# Patient Record
Sex: FEMALE | Race: WHITE | Employment: FULL TIME
[De-identification: names, ages, dates, MRNs, and addresses within clinical notes are randomized per-mention and may not be internally consistent; named-entity substitution may affect disease eponyms.]

---

## 2023-09-05 ENCOUNTER — HOSPITAL ENCOUNTER (OUTPATIENT)
Facility: HOSPITAL | Age: 53
Discharge: HOME | End: 2023-09-08
Payer: COMMERCIAL

## 2023-09-05 DIAGNOSIS — H53.19 OTHER SUBJECTIVE VISUAL DISTURBANCES: ICD-10-CM

## 2023-09-05 DIAGNOSIS — H53.2 DIPLOPIA: ICD-10-CM

## 2023-09-05 DIAGNOSIS — E05.00 THYROTOXICOSIS WITH DIFFUSE GOITER WITHOUT THYROTOXIC CRISIS OR STORM: ICD-10-CM

## 2023-09-05 PROCEDURE — 70481 CT ORBIT/EAR/FOSSA W/DYE: CPT

## 2023-09-05 PROCEDURE — 6360000004 HC RX CONTRAST MEDICATION: Performed by: OPHTHALMOLOGY

## 2023-09-05 RX ADMIN — IOPAMIDOL 100 ML: 755 INJECTION, SOLUTION INTRAVENOUS at 13:49

## 2024-06-12 ENCOUNTER — OFFICE VISIT (OUTPATIENT)
Age: 54
End: 2024-06-12
Payer: COMMERCIAL

## 2024-06-12 VITALS
HEART RATE: 72 BPM | TEMPERATURE: 98.2 F | HEIGHT: 67 IN | OXYGEN SATURATION: 99 % | SYSTOLIC BLOOD PRESSURE: 135 MMHG | WEIGHT: 162.4 LBS | DIASTOLIC BLOOD PRESSURE: 80 MMHG | BODY MASS INDEX: 25.49 KG/M2

## 2024-06-12 DIAGNOSIS — E05.00 GRAVES DISEASE: Primary | ICD-10-CM

## 2024-06-12 DIAGNOSIS — H57.89 THYROID EYE DISEASE: ICD-10-CM

## 2024-06-12 DIAGNOSIS — E07.9 THYROID EYE DISEASE: ICD-10-CM

## 2024-06-12 PROCEDURE — 99204 OFFICE O/P NEW MOD 45 MIN: CPT | Performed by: INTERNAL MEDICINE

## 2024-06-12 PROCEDURE — 3017F COLORECTAL CA SCREEN DOC REV: CPT | Performed by: INTERNAL MEDICINE

## 2024-06-12 PROCEDURE — G8419 CALC BMI OUT NRM PARAM NOF/U: HCPCS | Performed by: INTERNAL MEDICINE

## 2024-06-12 PROCEDURE — G8427 DOCREV CUR MEDS BY ELIG CLIN: HCPCS | Performed by: INTERNAL MEDICINE

## 2024-06-12 PROCEDURE — 4004F PT TOBACCO SCREEN RCVD TLK: CPT | Performed by: INTERNAL MEDICINE

## 2024-06-12 RX ORDER — METHIMAZOLE 10 MG/1
10 TABLET ORAL DAILY
COMMUNITY

## 2024-06-12 NOTE — PROGRESS NOTES
Endocrine Consultation    PCP: No primary care provider on file.    Chief Complaint   Patient presents with    Thyroid Problem     HPI:  Crystal Zaragoza is a 53 y.o. female with  has a past medical history of Anxiety, Graves disease, and Hypertension. who is seen in consultation at the request of No primary care provider on file. for evaluation of graves disease.     - dx w/hyperthyroidism in 2023   - on methimazole since then at varying doses  - switched insurances so switching endocrine care here  - now methimazole 10 mg daily, last dose change 3/2024  - also w/BARBRA, most notably diplopia, elevated TSI, 9/2023 CT w/enlargement and enhancement of the IR and MR bilaterally c/w BARBRA, start PO steroid taper at that time. Clinical activity score 3 at that time  - s/p Tepezza infusions, last 3/2024, infusions complete  - ophthalmology has recommended glasses w/prisms w/optometry  - prior thyroid US w/hypervascularity without nodules     Full ROS completed this visit:  Negative for weight gain, weight loss, fevers, chills, blurry vision, changes in vision, chest pain, palpitations, leg swelling, shortness of breath, wheezing, snoring, abdominal pain, nausea, vomiting, diarrhea, constipation, frequent urination, dysuria, tremors, fainting, shakes, cold intolerance, hot intolerance,foot sores, rash.    LABS/STUDIES:     No results found for: \"EFF2ENFO\"    No results found for: \"LABA1C\", \"[id number]\", \"CREATININE\", \"LABGLOM\", \"EGFR\", \"MALBCR\"    No results found for: \"CHOL\", \"TRIG\", \"HDL\"    No results found for: \"TSH\"    No results found for: \"CPEPLT\", \"CPEPTIDE\"    Current Outpatient Medications   Medication Sig Dispense Refill    methIMAzole (TAPAZOLE) 10 MG tablet Take 1 tablet by mouth daily       No current facility-administered medications for this visit.        Past Medical History:   Diagnosis Date    Anxiety     Graves disease     Hypertension         No past surgical history on file.     Social History     Tobacco

## 2024-06-12 NOTE — ASSESSMENT & PLAN NOTE
Dx w 8/2023 w/severe graves disease c/b BARBRA s/p Tepezza infusions x 8   - repeat labs today to guide dosing  - consider definitive therapy w/surgery if no remission   - continue methimazole 10 mg daily until labs back  - non smoker     Regarding thionamide: Patient cautioned regarding the risks of agranulocytosis/neutropenia or liver toxicity.  Was told that if development of fever, sore throat or sores in mouth, immediately contact me or PCP to check white blood cell count.  If development of nausea, vomiting or scleral icterus, immediately contact me or PCP to check liver function.  Patient informed that if the side effects develop and the drug is discontinued the problem generally recovers within a few days without additional therapy but if the drug is continued can develop serious, even life-threatening infection or liver failure.

## 2024-07-11 ENCOUNTER — OFFICE VISIT (OUTPATIENT)
Age: 54
End: 2024-07-11
Payer: COMMERCIAL

## 2024-07-11 VITALS
DIASTOLIC BLOOD PRESSURE: 81 MMHG | OXYGEN SATURATION: 96 % | WEIGHT: 163 LBS | BODY MASS INDEX: 25.58 KG/M2 | HEART RATE: 85 BPM | HEIGHT: 67 IN | SYSTOLIC BLOOD PRESSURE: 133 MMHG | TEMPERATURE: 97.8 F

## 2024-07-11 DIAGNOSIS — E05.00 GRAVES DISEASE: Primary | ICD-10-CM

## 2024-07-11 DIAGNOSIS — H57.89 THYROID EYE DISEASE: ICD-10-CM

## 2024-07-11 DIAGNOSIS — E07.9 THYROID EYE DISEASE: ICD-10-CM

## 2024-07-11 PROCEDURE — 99214 OFFICE O/P EST MOD 30 MIN: CPT | Performed by: INTERNAL MEDICINE

## 2024-07-11 PROCEDURE — G8419 CALC BMI OUT NRM PARAM NOF/U: HCPCS | Performed by: INTERNAL MEDICINE

## 2024-07-11 PROCEDURE — G8427 DOCREV CUR MEDS BY ELIG CLIN: HCPCS | Performed by: INTERNAL MEDICINE

## 2024-07-11 PROCEDURE — 3017F COLORECTAL CA SCREEN DOC REV: CPT | Performed by: INTERNAL MEDICINE

## 2024-07-11 PROCEDURE — 4004F PT TOBACCO SCREEN RCVD TLK: CPT | Performed by: INTERNAL MEDICINE

## 2024-07-11 RX ORDER — SPIRONOLACTONE 50 MG/1
50 TABLET, FILM COATED ORAL DAILY
COMMUNITY
Start: 2024-04-14

## 2024-07-11 RX ORDER — SERTRALINE HCL 50 MG
50 TABLET ORAL DAILY
COMMUNITY
Start: 2008-04-02

## 2024-07-11 RX ORDER — METHIMAZOLE 5 MG/1
5 TABLET ORAL DAILY
Qty: 90 TABLET | Refills: 0 | Status: SHIPPED | OUTPATIENT
Start: 2024-07-11

## 2024-07-11 NOTE — PROGRESS NOTES
Endocrine Follow up     PCP: Jes Macdonald MD    Chief Complaint   Patient presents with    Graves' Disease     HPI:  Crystal Zaragoza is a 53 y.o. female with  has a past medical history of Anxiety, Graves disease, Hypertension, and Hyperthyroidism. Here for follow up of graves disease.     Initial visit notes 6/12/24  - dx w/hyperthyroidism in 2023   - on methimazole since then at varying doses  - switched insurances so switching endocrine care here  - now methimazole 10 mg daily, last dose change 3/2024  - also w/BARBRA, most notably diplopia, elevated TSI, 9/2023 CT w/enlargement and enhancement of the IR and MR bilaterally c/w BARBRA, start PO steroid taper at that time. Clinical activity score 3 at that time  - s/p Tepezza infusions, last 3/2024, infusions complete  - ophthalmology has recommended glasses w/prisms w/optometry  - prior thyroid US w/hypervascularity without nodules     Today -   On methimazole 10 mg daily   Labs 6/13/24- TSH 3.91, TSI Ab negative, FT4 1.1, FT3 3.2 =, cbc NL  Feeling well  BRIEF ROS   Gen: no fevers/chills  CV: no chest pain  Resp: no shortness of breath, cough   GI: no nausea, emesis, abdominal pain  Neuro: no confusion     LABS/STUDIES:   No results found for: \"WJM0RGBY\"  No results found for: \"LABA1C\", \"WQHM8RCVT\", \"CREATININE\", \"LABGLOM\", \"EGFR\", \"MALBCR\"  No results found for: \"CHOL\", \"TRIG\", \"HDL\"  No results found for: \"TSH\"  No results found for: \"CPEPLT\", \"CPEPTIDE\"    Current Outpatient Medications   Medication Sig Dispense Refill    spironolactone (ALDACTONE) 50 MG tablet Take 1 tablet by mouth daily      ZOLOFT 50 MG tablet Take 1 tablet by mouth daily      Multiple Vitamins-Minerals (MULTIVITAMIN ADULTS PO) Take by mouth daily      methIMAzole (TAPAZOLE) 5 MG tablet Take 1 tablet by mouth daily 90 tablet 0     No current facility-administered medications for this visit.        Past Medical History:   Diagnosis Date    Anxiety     Graves disease     Hypertension

## 2024-07-11 NOTE — PROGRESS NOTES
Crystal Zaragoza is a 53 y.o. female here for   Chief Complaint   Patient presents with    Graves' Disease       1. Have you been to the ER, urgent care clinic since your last visit?  Hospitalized since your last visit? -NO    2. Have you seen or consulted any other health care providers outside of the [facility] since your last visit?  Include any pap smears or colon screening.-NO

## 2024-07-18 ENCOUNTER — TELEPHONE (OUTPATIENT)
Age: 54
End: 2024-07-18

## 2024-07-18 DIAGNOSIS — H57.89 THYROID EYE DISEASE: Primary | ICD-10-CM

## 2024-07-18 DIAGNOSIS — E07.9 THYROID EYE DISEASE: Primary | ICD-10-CM

## 2024-07-18 NOTE — TELEPHONE ENCOUNTER
Labs 7/11/24 reviewed    LFTs NL  TSH 6.09 (H)   TT3 NL at 84    Please inform  Reduce the methimazole from 10 to 5 mg daily  Repeat labs in 1 month    Please order TSH

## 2024-07-18 NOTE — TELEPHONE ENCOUNTER
Informed pt of Dr. Pradhan's note. Pt verbalized understanding with no further questions or concerns at this time.

## 2024-10-28 DIAGNOSIS — E05.00 GRAVES DISEASE: ICD-10-CM

## 2025-02-27 DIAGNOSIS — E05.00 GRAVES DISEASE: ICD-10-CM
